# Patient Record
Sex: MALE | Race: WHITE | URBAN - METROPOLITAN AREA
[De-identification: names, ages, dates, MRNs, and addresses within clinical notes are randomized per-mention and may not be internally consistent; named-entity substitution may affect disease eponyms.]

---
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
## 2024-10-05 ENCOUNTER — HOSPITAL ENCOUNTER (EMERGENCY)
Age: 24
Discharge: HOME | End: 2024-10-05
Payer: COMMERCIAL

## 2024-10-05 VITALS
RESPIRATION RATE: 18 BRPM | DIASTOLIC BLOOD PRESSURE: 68 MMHG | SYSTOLIC BLOOD PRESSURE: 106 MMHG | OXYGEN SATURATION: 100 % | HEART RATE: 76 BPM

## 2024-10-05 VITALS
RESPIRATION RATE: 17 BRPM | HEART RATE: 67 BPM | SYSTOLIC BLOOD PRESSURE: 124 MMHG | OXYGEN SATURATION: 100 % | DIASTOLIC BLOOD PRESSURE: 95 MMHG

## 2024-10-05 VITALS
DIASTOLIC BLOOD PRESSURE: 82 MMHG | SYSTOLIC BLOOD PRESSURE: 127 MMHG | HEART RATE: 100 BPM | TEMPERATURE: 97.52 F | RESPIRATION RATE: 16 BRPM | OXYGEN SATURATION: 99 %

## 2024-10-05 VITALS — OXYGEN SATURATION: 100 %

## 2024-10-05 DIAGNOSIS — R11.2: Primary | ICD-10-CM

## 2024-10-05 LAB
ADD MANUAL DIFF: NO
ADD URINE MICROSCOPIC?: NO
ALANINE AMINOTRANSFERASE: 28 U/L (ref 6–50)
ALBUMIN LEVEL: 4.1 G/DL (ref 3.5–5.1)
ALBUMIN SPEC-MCNC: 4.1 G/DL (ref 3.5–5.1)
ALKALINE PHOSPHATASE: 113 U/L (ref 38–126)
ALP SERPL-CCNC: 113 U/L (ref 38–126)
ALT SERPL-CCNC: 28 U/L (ref 6–50)
ANION GAP: 10 MMOL/L (ref 4–12)
ASPARTATE AMINO TRANSFERASE: 40 U/L (ref 17–59)
AST SERPL-CCNC: 40 U/L (ref 17–59)
BASIC METABOLIC AND HEMATOCRIT PNL BLD: 34.7 % (ref 42–52)
BILIRUBIN,TOTAL: 2.3 MG/DL (ref 0.2–1.3)
BLOOD UREA NITROGEN: 13 MG/DL (ref 9–20)
BUN SERPL-MCNC: 13 MG/DL (ref 9–20)
CALCIUM: 8.6 MG/DL (ref 8.4–10.2)
CARBON DIOXIDE: 25 MMOL/L (ref 22–30)
CHLORIDE SPEC-MCNC: 106 MMOL/L (ref 98–107)
CHLORIDE: 106 MMOL/L (ref 98–107)
CREAT CL PREDICTED SERPL C-G-VRATE: 135 ML/MIN
ESTIMATED CRCL CALCULATION: 135 ML/MIN
ESTIMATED GLOMERULAR FILT RATE: > 60
GFRSERPLBLD MDRD-ARVRAT: > 60 ML/MIN/{1.73_M2}
GLUCOSE SERPL-MCNC: 98 MG/DL (ref 65–110)
GLUCOSE: 98 MG/DL (ref 65–110)
HEMATOCRIT: 34.7 % (ref 42–52)
HEMOGLOBIN: 10.8 G/DL (ref 14–18)
HGB+HCT PNL BLD: 34.7 % (ref 42–52)
IMM GRANULOCYTES NFR BLD AUTO: 0.2 % (ref 0–0.5)
IPF CHARGE: (no result)
LIPASE SERPL-CCNC: 14 U/L (ref 23–300)
LIPASE: 14 U/L (ref 23–300)
LYMPHOCYTES # SPEC AUTO: 0.83 K/MM3 (ref 0.9–3.2)
MCV RBC: 80.7 FL (ref 80–100)
MEAN CORPUSCULAR HEMOGLOBIN: 25.1 PG (ref 26–34)
MEAN CORPUSCULAR HGB CONC: 31.1 G/DL (ref 32–36)
MICRO URNS: NO
NUCLEATED RED BLOOD CELLS ABSOLUTE AUTO: 0 K/MM3 (ref 0–0.01)
NUCLEATED RED BLOOD CELLS PERC: 0 % (ref 0–0.2)
PLATELET COUNT RESULT: 161 K/MM3 (ref 150–375)
POTASSIUM: 3.6 MMOL/L (ref 3.4–5)
PROT SERPL-MCNC: 7 G/DL (ref 6.3–8.2)
RED BLOOD COUNT: 4.3 M/MM3 (ref 4.6–6.2)
SODIUM: 141 MMOL/L (ref 137–145)
SP GR UR: 1.01 (ref 1–1.03)
TOTAL PROTEIN: 7 G/DL (ref 6.3–8.2)
WHITE BLOOD COUNT: 4.5 K/MM3 (ref 4.5–10)

## 2024-10-05 PROCEDURE — 96361 HYDRATE IV INFUSION ADD-ON: CPT

## 2024-10-05 PROCEDURE — 36415 COLL VENOUS BLD VENIPUNCTURE: CPT

## 2024-10-05 PROCEDURE — 83690 ASSAY OF LIPASE: CPT

## 2024-10-05 PROCEDURE — 81003 URINALYSIS AUTO W/O SCOPE: CPT

## 2024-10-05 PROCEDURE — 85025 COMPLETE CBC W/AUTO DIFF WBC: CPT

## 2024-10-05 PROCEDURE — 96374 THER/PROPH/DIAG INJ IV PUSH: CPT

## 2024-10-05 PROCEDURE — 80053 COMPREHEN METABOLIC PANEL: CPT

## 2024-10-05 PROCEDURE — 99284 EMERGENCY DEPT VISIT MOD MDM: CPT

## 2024-10-05 PROCEDURE — 96375 TX/PRO/DX INJ NEW DRUG ADDON: CPT

## 2024-10-05 NOTE — ED.ABDPAIN
HPI - Abdominal Pain
General
Chief Complaint: Abdominal Pain
Stated Complaint: abd pain
Time Seen by Provider: 10/05/24 16:40
History of Present Illness
HPI narrative: 
Patient presents here with slight nausea, vomiting, diarrhea, started 1-2 days ago, also has diffuse abdominal pain worse on the right side.  History of pancreas surgery in June.
Related Data
Allergies

Allergy/AdvReac Type Severity Reaction Status Date / Time
No Known Allergies Allergy   Verified 10/05/24 16:16



Review of Systems
Review of Systems:   All systems reviewed & are unremarkable except as noted in HPI and below

Exam
Narrative:   
EXAMINATION OF ORGAN SYSTEMS/BODY AREAS:
Constitutional: Vital signs per nursing
GENERAL:[No acute distress, non-toxic appearing.]
HEAD:  Normal with no signs of head trauma.
EYES:  EOMI, conjunctiva normal
ENT:  Hearing grossly intact
LUNGS:  Nonlabored breathing.
HEART:  [Regular rate and rhythm]
ABD:  [Soft], [nontender to palpation], no RLQ tenderness, neg Marlow's sign
EXT: Normal range of motion
SKIN:  [No rashes or lesions.]
NEURO: [Alert and oriented x 3. No gross focal sensory or strength deficits.]
PSYCH: Normal affect
 

Course
Vital Signs
Vital signs: 

Vital Signs

Temperature  97.5 F L  10/05/24 16:19
Pulse Rate  100   10/05/24 16:19
Respiratory Rate  16   10/05/24 16:19
Blood Pressure  127/82   10/05/24 16:19
Pulse Oximetry  99   10/05/24 16:19



Temperature  97.5 F L  10/05/24 16:19
Pulse Rate  100   10/05/24 16:19
Respiratory Rate  16   10/05/24 16:19
Blood Pressure  127/82   10/05/24 16:19
Pulse Oximetry  99   10/05/24 16:19




MDM - Abdominal Pain
MDM Narrative
Medical decision making narrative: 
24M h/o pancreatic surgery p/w n/v and abd pain last 1-2 days; had similar sx 1-2 wk ago with w/u including CT scan that was negative, same sx today. 

Abdomen is soft and nontender here and he is overall well-appearing, labs obtained without any obvious abnormality, he has no elevated white count, after medications he feels much better and states that his symptoms have completely resolved.  Should 
decision making with patient and mother at bedside, he has already had multiple recent CTs that were unremarkable and with a long discussion, will defer CT today given his completely benign exam and resolved symptoms, and he has specialist follow-up 
in the next 3 days.  Return precautions discussed, parent promises to take him back if he starts having symptoms again or anything else concerning.
Lab Data

10/05/24 17:04          

10/05/24 17:04          

Labs: 

Lab Results

  10/05/24 Range/Units
  17:04 
WBC  4.5  (4.5-10.0)  K/mm3
RBC  4.30 L  (4.6-6.20)  M/mm3
Hgb  10.8 L  (14.0-18.0)  g/dL
Hct  34.7 L  (42.0-52.0)  %
MCV  80.7  ()  fl
MCH  25.1 L  (26-34)  pg
MCHC  31.1 L  (32-36)  g/dl
RDW  16.7 H  (11.5-14.5)  %
Plt Count  161  (150-375)  k/mm3
MPV  10.2  (7.4-10.4)  fl
Immature Gran % (Auto)  0.2  (0-0.5)  %
Neut % (Auto)  71.6  (45.5-73.1)  %
Lymph % (Auto)  18.6  (18.3-44.2)  %
Mono % (Auto)  7.8  (2.6-8.5)  %
Eos % (Auto)  1.1  (0-4.4)  %
Baso % (Auto)  0.7  (0.2-1.2)  %
Lymph # (Auto)  0.83 L  (0.9-3.2)  K/mm3
Mono # (Auto)  0.4  (0.1-0.6)  K/mm3
Eos # (Auto)  0.1  (0-0.3)  K/mm3
Baso # (Auto)  0.0  (0.0-0.1)  K/mm3
Abs Immat Gran (auto)  0.01  (0.00-0.031)  K/mm3
Absolute Neuts (auto)  3.2  (1.3-6.7)  K/mm3
Absolute Nucleated RBC  0.000  (0.0-0.012)  K/mm3
Nucleated RBC %  0.0  (0.0-0.2)  %
Sodium  141  (137-145)  mmol/L
Potassium  3.6  (3.4-5.0)  mmol/L
Chloride  106  ()  mmol/L
Carbon Dioxide  25  (22-30)  mmol/L
Anion Gap  10  (4-12)  mmol/L
BUN  13  (9-20)  mg/dL
Creatinine  0.70  (0.7-1.3)  mg/dL
Estim Creat Clear Calc  135  ml/min
Estimated GFR  > 60 (59 - ) 
Glucose  98  ()  mg/dL
Calcium  8.6  (8.4-10.2)  mg/dL
Total Bilirubin  2.3 H  (0.2-1.3)  mg/dL
AST  40  (17-59)  U/L
ALT  28  (6-50)  U/L
Alkaline Phosphatase  113  ()  U/L
Total Protein  7.0  (6

## 2024-10-05 NOTE — ED_ITS
HPI - Abdominal Pain    
General    
Chief Complaint: Abdominal Pain    
Stated Complaint: abd pain    
Time Seen by Provider: 10/05/24 16:40    
History of Present Illness    
HPI narrative:     
Patient presents here with slight nausea, vomiting, diarrhea, started 1-2 days   
ago, also has diffuse abdominal pain worse on the right side.  History of   
pancreas surgery in June.    
Related Data    
                                    Allergies    
    
    
    
Allergy/AdvReac Type Severity Reaction Status Date / Time    
     
No Known Allergies Allergy   Verified 10/05/24 16:16    
    
    
    
    
Review of Systems    
    
    
Review of Systems:   All systems reviewed & are unremarkable except as noted in   
HPI and below      
    
    
Exam    
    
    
Narrative:       
EXAMINATION OF ORGAN SYSTEMS/BODY AREAS:    
Constitutional: Vital signs per nursing    
GENERAL:[No acute distress, non-toxic appearing.]    
HEAD:  Normal with no signs of head trauma.    
EYES:  EOMI, conjunctiva normal    
ENT:  Hearing grossly intact    
LUNGS:  Nonlabored breathing.    
HEART:  [Regular rate and rhythm]    
ABD:  [Soft], [nontender to palpation], no RLQ tenderness, neg Marlow's sign    
EXT: Normal range of motion    
SKIN:  [No rashes or lesions.]    
NEURO: [Alert and oriented x 3. No gross focal sensory or strength deficits.]    
PSYCH: Normal affect    
       
    
    
Course    
Vital Signs    
Vital signs:     
    
                                   Vital Signs    
    
    
    
Temperature  97.5 F L  10/05/24 16:19    
     
Pulse Rate  100   10/05/24 16:19    
     
Respiratory Rate  16   10/05/24 16:19    
     
Blood Pressure  127/82   10/05/24 16:19    
     
Pulse Oximetry  99   10/05/24 16:19    
    
    
                                            
    
    
    
Temperature  97.5 F L  10/05/24 16:19    
     
Pulse Rate  100   10/05/24 16:19    
     
Respiratory Rate  16   10/05/24 16:19    
     
Blood Pressure  127/82   10/05/24 16:19    
     
Pulse Oximetry  99   10/05/24 16:19    
    
    
    
    
    
MDM - Abdominal Pain    
MDM Narrative    
Medical decision making narrative:     
24M h/o pancreatic surgery p/w n/v and abd pain last 1-2 days; had similar sx 1-  
2 wk ago with w/u including CT scan that was negative, same sx today.     
    
Abdomen is soft and nontender here and he is overall well-appearing, labs   
obtained without any obvious abnormality, he has no elevated white count, after   
medications he feels much better and states that his symptoms have completely   
resolved.  Should decision making with patient and mother at bedside, he has alr  
cierra had multiple recent CTs that were unremarkable and with a long discussion,   
will defer CT today given his completely benign exam and resolved symptoms, and   
he has specialist follow-up in the next 3 days.  Return precautions discussed,   
parent promises to take him back if he starts having symptoms again or anything   
else concerning.    
Lab Data    
    
                                                        10/05/24 17:04              
    
                                                        10/05/24 17:04              
    
Labs:     
    
                                   Lab Results    
    
    
    
  10/05/24 Range/Units    
    
  17:04     
     
WBC  4.5  (4.5-10.0)  K/mm3    
     
RBC  4.30 L  (4.6-6.20)  M/mm3    
     
Hgb  10.8 L  (14.0-18.0)  g/dL    
     
Hct  34.7 L  (42.0-52.0)  %    
     
MCV  80.7  ()  fl    
     
MCH  25.1 L  (26-34)  pg    
     
MCHC  31.1 L  (32-36)  g/dl    
     
RDW  16.7 H  (11.5-14.5)  %    
     
Plt Count  161  (150-375)  k/mm3    
     
MPV  10.2  (7.4-10.4)  fl    
     
Immature Gran % (Auto)  0.2  (0-0.5)  %    
     
Neut % (Auto)  71.6  (45.5-73.1)  %    
     
Lymph % (Auto)  18.6  (18.3-44.2)  %    
     
Mono % (A